# Patient Record
Sex: FEMALE
[De-identification: names, ages, dates, MRNs, and addresses within clinical notes are randomized per-mention and may not be internally consistent; named-entity substitution may affect disease eponyms.]

---

## 2018-03-22 NOTE — PCM.PREANE
Preanesthetic Assessment





- Anesthesia/Transfusion/Family Hx


Anesthesia History: Prior Anesthesia Without Reaction


Transfusion History: No Prior Transfusion(s)





- Review of Systems


General: No Symptoms


Pulmonary: No Symptoms


Cardiovascular: No Symptoms


Gastrointestinal: No Symptoms


Neurological: No Symptoms


Other: Reports: None





- Physical Assessment


Height: 5 ft 3 in


Weight: 63.049 kg


ASA Class: 2


Mental Status: Alert & Oriented x3


Airway Class: Mallampati = 2


Dentition: Reports: Normal Dentition


Thyro-Mental Finger Breadths: 3


Mouth Opening Finger Breadths: 3


ROM/Head Extension: Full


Lungs: Clear to Auscultation, Normal Respiratory Effort


Cardiovascular: Regular Rate, Regular Rhythm





- Lab


Values: 





 Laboratory Last Values











WBC  9.93 K/uL (4.0-11.0)   18  05:30    


 


RBC  3.89 M/uL (4.30-5.90)  L  18  05:30    


 


Hgb  11.4 g/dL (12.0-16.0)  L  18  05:30    


 


Hct  33.5 % (36.0-46.0)  L  18  05:30    


 


MCV  86.1 fL (80.0-98.0)   18  05:30    


 


MCH  29.3 pg (27.0-32.0)   18  05:30    


 


MCHC  34.0 g/dL (31.0-37.0)   18  05:30    


 


RDW Std Deviation  42.9 fl (28.0-62.0)   18  05:30    


 


RDW Coeff of Mayco  14 % (11.0-15.0)   18  05:30    


 


Plt Count  180 K/uL (150-400)   18  05:30    


 


MPV  11.70 fL (7.40-12.00)   18  05:30    


 


Nucleated RBC %  0.0 /100WBC  18  05:30    


 


Nucleated RBCs #  0 K/uL  18  05:30    


 


Blood Type  O POSITIVE   18  05:30    


 


Antibody Screen  NEGATIVE   18  05:30    














- Allergies


Allergies/Adverse Reactions: 


 Allergies











Allergy/AdvReac Type Severity Reaction Status Date / Time


 


No Known Allergies Allergy   Verified 14 10:59














- Acknowledgements


Anesthesia Type Planned: Epidural


Pt an Appropriate Candidate for the Planned Anesthesia: Yes


Alternatives and Risks of Anesthesia Discussed w Pt/Guardian: Yes


Pt/Guardian Understands and Agrees with Anesthesia Plan: Yes





PreAnesthesia Questionnaire





- Past Health History


Medical/Surgical History: Denies Medical/Surgical History


HEENT History: Reports: Other (See Below)


Other HEENT History: Wears eyeglasses


Cardiovascular History: Reports: None


Respiratory History: Reports: None


Gastrointestinal History: Reports: GERD


Genitourinary History: Reports: None


OB/GYN History: Reports: Pregnancy


: 4


Para: 2


LMP (Approximate): Pregnant


Musculoskeletal History: Reports: None


Neurological History: Reports: None


Psychiatric History: Reports: None


Endocrine/Metabolic History: Reports: None


Hematologic History: Reports: None


Immunologic History: Reports: None


Oncologic (Cancer) History: Reports: None


Dermatologic History: Reports: None





- Infectious Disease History


Infectious Disease History: Reports: Chicken Pox





- SUBSTANCE USE


Smoking Status *Q: Never Smoker


Second Hand Smoke Exposure: No


Days Per Week of Alcohol Use: 0


Recreational Drug Use History: No





- HOME MEDS


Home Medications: 


 Home Meds





Cyclobenzaprine [Flexeril] 10 mg PO TID PRN #10 tablet 16 [Rx]


Diclofenac Sodium [Voltaren] 50 mg PO TIDMEALS PRN #10 tab.ec 16 [Rx]


Norethindrone [Venecia] 0.35 mg PO DAILY 16 [History]











- CURRENT (IN HOUSE) MEDS


Current Meds: 





 Current Medications





Butorphanol Tartrate (Stadol)  1 mg IVPUSH Q1H PRN


   PRN Reason: Pain


Carboprost Tromethamine (Hemabate Ds)  250 mcg IM ASDIRECTED PRN


   PRN Reason: Post Partum Hemorrhage


Lactated Ringer's (Ringers, Lactated)  1,000 mls @ 150 mls/hr IV ASDIRECTED DANNI


   Last Admin: 18 06:27 Dose:  150 mls/hr


Tranexamic Acid 1,000 mg/ (Sodium Chloride)  110 mls @ 660 mls/hr IV ONETIME PRN


   PRN Reason: Bleeding


Oxytocin/Sodium Chloride (Oxytocin 30 Unit/500 Ml-Ns)  30 unit in 500 mls @ 2 

mls/hr IV TITRATE DANNI; 2 MUNITS/MIN


   PRN Reason: Protocol


   Last Titration: 18 07:09 Dose:  4 munits/min, 4 mls/hr


Lidocaine HCl (Xylocaine 1%)  50 ml INJECT .ONCE PRN


   PRN Reason: Laceration repair


Methylergonovine Maleate (Methergine)  0.2 mg IM ASDIRECTED PRN


   PRN Reason: Post Partum Hemorrhage


Misoprostol (Cytotec)  200 mcg PO .ONCE PRN


   PRN Reason: Post Partum Hemorrhage


Nalbuphine HCl (Nubain)  10 mg IVPUSH Q1H PRN


   PRN Reason: Pain (severe 7-10)


Sodium Chloride (Saline Flush)  10 ml FLUSH ASDIRECTED PRN


   PRN Reason: Keep Vein Open


Sodium Chloride (Saline Flush)  2.5 ml FLUSH ASDIRECTED PRN


   PRN Reason: Keep Vein Open


Sterile Water (Sterile Water For Irrigation)  1,000 ml IRR ASDIRECTED PRN


   PRN Reason: delivery


Terbutaline Sulfate (Brethine)  0.25 mg SUBCUT ASDIRECTED PRN


   PRN Reason: Tacysystole

## 2018-03-22 NOTE — OR
SURGEON:

Annabel Mayen MD

 

DATE OF PROCEDURE:  2018

 

PREOPERATIVE DIAGNOSES:

1. Intrauterine pregnancy at 40 weeks and 3 days.

2. Postdates induction of labor.

 

POSTOPERATIVE DIAGNOSES:

1. Intrauterine pregnancy at 40 weeks and 3 days.

2. Postdates induction of labor.

3. Delivered.

 

PROCEDURE:

Spontaneous vaginal delivery.

 

ANESTHESIA:

Epidural.

 

ESTIMATED BLOOD LOSS:

150 mL.

 

COMPLICATIONS:

None.

 

DISPOSITION:

Mother and baby stable in Labor and Delivery room, [facility].

 

FINDINGS:

Male infant, weight 3700 g, Apgar score 8 and 9 at 1 and 5 minutes respectively.

Grossly normal placenta with three-vessel cord.  Intact perineum.

 

BRIEF HISTORY:

Abeba is a 27-year-old G4, P2-0-1-2, who was admitted today at 40 weeks and 3

days gestation for postdates induction of labor.  Her prenatal care was

uncomplicated.  GBS negative.  On admission, she was 2 to 3 cm dilated, 80%

effaced, station -2.  Oxytocin infusion titration for induction of labor was

commenced as per protocol and an artificial rupture of membrane was performed 3

hours later with clear fluid.She subsequently received epidural for pain 
management.  

She progressed quickly on only a maximum dose of 6 milliunits per minute and 
with

increasing rectal pressure, she commenced active pushing.  Fetal heart tracing

was category 1 throughout.  She pushed quite well bringing the baby's head down

to a +4 station and was set up for delivery in modified dorsal lithotomy

position.

 

PROCEDURE IN DETAIL:

She had a spontaneous vaginal delivery of a live male infant in right

occipitoanterior position, no nuchal cord, clear amniotic fluid at delivery.

Anterior and posterior shoulders and the rest of the baby were delivered without

difficulty.  Baby was vigorous and cried spontaneously at birth.  The baby was

delivered onto the maternal abdomen.  Delayed cord clamping was observed and the

cord was subsequently cut by the father of the baby. With delivery of the

infant, oxytocin infusion was converted to postpartum titration for active

management of 3rd stage of labor. Cord blood and gas samples were obtained.

Placenta was delivered by controlled cord traction, appeared to be complete and

intact.  Examination of the perineum revealed no lacerations.  Uterine massage

was performed.  Uterus was found to be well contracted below the umbilicus.  The

patient tolerated the procedure well.  Sponge, instrument, and needle counts

were correct at the end of the delivery.

 

 

ADUMVIV / MODL

DD:  2018 12:04:22

DT:  2018 13:41:20

Job #:  [id number]

VIVI

## 2018-03-23 NOTE — [id number]
Post Anesthesia Note





- EVALUATION WITHIN 48HRS OF ANESTHETIC


Vital Signs in Normal Range: Yes


Patient Participated in Evaluation: Yes


Respiratory Function Stable: Yes


Airway Patent: Yes


Cardiovascular Function Stable: Yes


Hydration Status Stable: Yes


Pain Control Satisfactory: Yes


Nausea and Vomiting Control Satisfactory: Yes


Mental Status Recovered: Yes


Resp Rate: 18

## 2018-03-23 NOTE — PCM.PNPP
- General Info


Date of Service: 18


Functional Status: Reports: Pain Controlled, Tolerating Diet, Ambulating, 

Urinating





- Review of Systems


General: Denies: Fever, Fatigue, Malaise


HEENT: Denies: Headaches


Pulmonary: Denies: Shortness of Breath, Pleuritic Chest Pain


Cardiovascular: Denies: Chest Pain, Palpitations, Dyspnea on Exertion


Gastrointestinal: Denies: Abdominal Pain


Genitourinary: Reports: Flank Pain.  Denies: Dysuria, Incontinence


Psychiatric: Denies: Depression, Mood Lability, Anxiety





- General Info


Date of Service: 18





- Patient Data


Vital Signs - Most Recent: 


 Last Vital Signs











Temp  36.4 C   18 05:38


 


Pulse  59 L  18 05:38


 


Resp  16   18 05:38


 


BP  99/58 L  18 05:38


 


Pulse Ox  96   18 05:38











Weight - Most Recent: 139 lb


Lab Results - Last 24 Hours: 


 Laboratory Results - last 24 hr











  18 Range/Units





  11:00 04:47 


 


Hgb   10.0 L  (12.0-16.0)  g/dL


 


Hct   29.6 L  (36.0-46.0)  %


 


Cord ABG pH  7.282   (7.18-7.38)  


 


Cord ABG Base Excess  -6   (-10--2)  


 


Cord VBG pH  7.384   (7.25-7.45)  


 


Cord VBG Base Excess  -4   (-10--2)  











Med Orders - Current: 


 Current Medications





Acetaminophen (Tylenol Extra Strength)  500 mg PO Q4H PRN


   PRN Reason: Pain


Acetaminophen (Tylenol Extra Strength)  1,000 mg PO Q4H PRN


   PRN Reason: Pain


Benzocaine/Menthol (Dermoplast Pain Relief 20%-0.5% Spray)  78 gm TOP 

ASDIRECTED PRN


   PRN Reason: Perineal Comfort Measure


Bisacodyl (Dulcolax)  10 mg RECTAL .ONCE PRN


   PRN Reason: Constipation


Docusate Sodium (Colace)  100 mg PO BID PRN


   PRN Reason: Constipation


Emollient Ointment (Lansinoh Hpa)  0 gm TOP ASDIRECTED PRN


   PRN Reason: Sore Nipples


Ibuprofen (Motrin)  400 mg PO Q4H PRN


   PRN Reason: Pain


Ibuprofen (Motrin)  800 mg PO Q6H PRN


   PRN Reason: Pain


   Last Admin: 18 05:26 Dose:  800 mg


Oxycodone HCl (Oxycodone)  5 mg PO Q2H PRN


   PRN Reason: Pain


   Last Admin: 18 17:49 Dose:  5 mg


Witch Hazel (Tucks)  1 pad TOP ASDIRECTED PRN


   PRN Reason: comfort care





Discontinued Medications





Butorphanol Tartrate (Stadol)  1 mg IVPUSH Q1H PRN


   PRN Reason: Pain


Carboprost Tromethamine (Hemabate Ds)  250 mcg IM ASDIRECTED PRN


   PRN Reason: Post Partum Hemorrhage


Lactated Ringer's (Ringers, Lactated)  1,000 mls @ 150 mls/hr IV ASDIRECTED DANNI


   Last Admin: 18 09:30 Dose:  500 mls/hr


Tranexamic Acid 1,000 mg/ (Sodium Chloride)  110 mls @ 660 mls/hr IV ONETIME PRN


   PRN Reason: Bleeding


Oxytocin/Sodium Chloride (Oxytocin 30 Unit/500 Ml-Ns)  30 unit in 500 mls @ 2 

mls/hr IV TITRATE DANNI; 2 MUNITS/MIN


   PRN Reason: Protocol


   Last Titration: 18 11:11 Dose:  999 mls/hr


Fentanyl/Bupivacaine HCl (Fentanyl-Bupiv-Ns 2 Mcg/Ml-0.125%) Confirm 

Administered Dose 100 mls @ as directed EP .ST-MED ONE


   Stop: 18 09:03


Lidocaine HCl (Xylocaine 1%)  50 ml INJECT .ONCE PRN


   PRN Reason: Laceration repair


Methylergonovine Maleate (Methergine)  0.2 mg IM ASDIRECTED PRN


   PRN Reason: Post Partum Hemorrhage


Misoprostol (Cytotec)  200 mcg PO .ONCE PRN


   PRN Reason: Post Partum Hemorrhage


Nalbuphine HCl (Nubain)  10 mg IVPUSH Q1H PRN


   PRN Reason: Pain (severe 7-10)


Sodium Chloride (Saline Flush)  10 ml FLUSH ASDIRECTED PRN


   PRN Reason: Keep Vein Open


Sodium Chloride (Saline Flush)  2.5 ml FLUSH ASDIRECTED PRN


   PRN Reason: Keep Vein Open


Sterile Water (Sterile Water For Irrigation)  1,000 ml IRR ASDIRECTED PRN


   PRN Reason: delivery


Terbutaline Sulfate (Brethine)  0.25 mg SUBCUT ASDIRECTED PRN


   PRN Reason: Tacysystole











- Infant Interaction


Infant Disposition, Postpartum:  in Room with Family


Infant Feeding: Continues to Breastfeed


Support Person: 





- Postpartum Recovery Exam


Fundal Level: 2 Fingerbreadths Above Umbilicus


Fundal Placement: Midline


Lochia Amount: Scant


Episiotomy/Laceration: None


Urinary Elimination: Voided





- Exam


General: Alert, Oriented


HEENT: Pupils Equal


Neck: Supple


Lungs: Clear to Auscultation, Normal Respiratory Effort


Cardiovascular: Regular Rate, Regular Rhythm


GI/Abdominal Exam: Non-Tender


Extremities: Non-Tender, Pedal Edema


Skin: Warm


Psy/Mental Status: Alert, Normal Affect, Normal Mood





- Problem List & Annotations


(1) Vaginal delivery


SNOMED Code(s): 129407669


   Code(s): O80 - ENCOUNTER FOR FULL-TERM UNCOMPLICATED DELIVERY   Status: 

Acute   Current Visit: Yes   





- Problem List Review


Problem List Initiated/Reviewed/Updated: Yes





- My Orders


Last 24 Hours: 


My Active Orders





18 11:27


Patient Status [ADT] Routine 


May Shower [RC] ASDIRECTED 


Up ad Winsome [RC] ASDIRECTED 


Vital Signs [RC] PER UNIT ROUTINE 


Acetaminophen [Tylenol Extra Strength]   1,000 mg PO Q4H PRN 


Acetaminophen [Tylenol Extra Strength]   500 mg PO Q4H PRN 


Benzocaine/Menthol [Dermoplast Pain Relief 20%-0.5% Spray]   78 gm TOP 

ASDIRECTED PRN 


Bisacodyl [Dulcolax]   10 mg RECTAL .ONCE PRN 


Docusate Sodium [Colace]   100 mg PO BID PRN 


Ibuprofen [Motrin]   400 mg PO Q4H PRN 


Ibuprofen [Motrin]   800 mg PO Q6H PRN 


Lanolin [Lansinoh HPA]   See Dose Instructions  TOP ASDIRECTED PRN 


Witch Hazel [Tucks]   1 pad TOP ASDIRECTED PRN 


oxyCODONE   5 mg PO Q2H PRN 


Assess Lochia [WOMSER] Per Unit Routine 


Assess Uterine Involution [WOMSER] Per Unit Routine 


Peripheral IV Discontinue [OM.PC] Routine 


Resuscitation Status Routine 





18 11:28


Perineal Care [OM.PC] Per Unit Routine 





18 Breakfast


Regular Diet [DIET] 














- Assessment


Assessment:: 


PPD#1 s/P , stable or afebrile


Clinically stable for discharge today








- Plan


Plan:: 


Discharge instructions reviewed with patient


Nothing in the vagina for 6 weeks


Bleeding and infection precautions reviewed


Continue PNV. 


Postpartum blues/ depression S/S reviewed


Follow up in 6 weeks

## 2021-07-03 ENCOUNTER — HOSPITAL ENCOUNTER (INPATIENT)
Dept: HOSPITAL 56 - MW.OBCHECK | Age: 31
LOS: 1 days | Discharge: HOME | DRG: 560 | End: 2021-07-04
Attending: OBSTETRICS & GYNECOLOGY | Admitting: OBSTETRICS & GYNECOLOGY
Payer: COMMERCIAL

## 2021-07-03 DIAGNOSIS — Z3A.39: ICD-10-CM

## 2021-07-03 DIAGNOSIS — Z20.822: ICD-10-CM

## 2021-07-03 PROCEDURE — U0002 COVID-19 LAB TEST NON-CDC: HCPCS

## 2021-07-03 PROCEDURE — 3E0R3BZ INTRODUCTION OF ANESTHETIC AGENT INTO SPINAL CANAL, PERCUTANEOUS APPROACH: ICD-10-PCS | Performed by: OBSTETRICS & GYNECOLOGY

## 2021-07-03 PROCEDURE — 10907ZC DRAINAGE OF AMNIOTIC FLUID, THERAPEUTIC FROM PRODUCTS OF CONCEPTION, VIA NATURAL OR ARTIFICIAL OPENING: ICD-10-PCS | Performed by: OBSTETRICS & GYNECOLOGY

## 2021-07-03 NOTE — PCM.LDHP
L&D History of Present Illness





- General


Date of Service: 07/03/21


Admit Problem/Dx: 


                   Patient Status Order with Admit Dx/Problem





07/03/21 08:00


Patient Status [ADT] Routine 





07/03/21 10:00


Patient Status [ADT] Routine 








                           Admission Diagnosis/Problem











Admission Diagnosis/Problem    Pregnancy














Source of Information: Patient


History Limitations: Reports: No Limitations





- History of Present Illness


Improves with: Reports: None


Worsens with: Reports: None


Associated Symptoms: Reports: N





- Related Data


Allergies/Adverse Reactions: 


                                    Allergies











Allergy/AdvReac Type Severity Reaction Status Date / Time


 


No Known Allergies Allergy   Verified 07/03/21 09:27











Home Medications: 


                                    Home Meds





Calcium Carbonate [Calcium] 500 mg PO DAILY 07/03/21 [History]


Omega-3 Fatty Acids/Fish Oil [Fish Oil 1,000 mg Capsule] 1 each PO DAILY 

07/03/21 [History]


Prenatal Vits #93/Iron Fum/FA [Prenatal Formula Tablet] 1 each PO DAILY 07/03/21

[History]











Past Medical History





- Past Health History


Medical/Surgical History: Denies Medical/Surgical History


HEENT History: Reports: Other (See Below)


Other HEENT History: Wears eyeglasses


Cardiovascular History: Reports: None


Respiratory History: Reports: None


Gastrointestinal History: Reports: GERD


Genitourinary History: Reports: None


OB/GYN History: Reports: Pregnancy


Musculoskeletal History: Reports: None


Neurological History: Reports: None


Psychiatric History: Reports: None


Endocrine/Metabolic History: Reports: None


Hematologic History: Reports: None


Immunologic History: Reports: None


Oncologic (Cancer) History: Reports: None


Dermatologic History: Reports: None





- Infectious Disease History


Infectious Disease History: Reports: Chicken Pox





Social & Family History





- Family History


Family Medical History: No Pertinent Family History


Cardiac: Reports: Heart Valve Replacement, Hypertension, MI


Respiratory: Reports: COPD


GI: Reports: None


: Reports: None


OBGYN: Reports: Pregnancy


Musculoskeletal: Reports: None


Neurological: Reports: CVA, Seizure


Psychiatric: Reports: None


Endocrine/Metabolic: Reports: Diabetes, Type I


Hematologic: Reports: Anemia


Dermatologic: Reports: None


Oncologic: Reports: Colon





- Caffeine Use


Caffeine Use: Reports: Coffee, Soda





H&P Review of Systems





- Review of Systems:


Review Of Systems: See Below


General: Reports: No Symptoms


HEENT: Reports: No Symptoms


Pulmonary: Reports: No Symptoms


Cardiovascular: Reports: No Symptoms


Gastrointestinal: Reports: No Symptoms


Genitourinary: Reports: No Symptoms


Musculoskeletal: Reports: No Symptoms


Skin: Reports: No Symptoms


Psychiatric: Reports: No Symptoms


Neurological: Reports: No Symptoms


Hematologic/Lymphatic: Reports: No Symptoms


Immunologic: Reports: No Symptoms





L&D Exam





- Exam


Exam: See Below





- Vital Signs


Weight: 63.957 kg





- OB Specific


Contraction Intensity: Moderate


Fetal Movement: Active


Fetal Heart Tones: Present


Birth Presentation: Vertex





- Lundy Score


Lundy Score Cervix Position: Anterior


Lundy Score Consistency: Soft


Lundy Score Effacement: >80%


Lundy Score Dilation: > 5 cm


Lundy Score Infant's Station: -3


Lundy Score Total: 10





- Exam


General: Alert, Oriented


HEENT: PERRLA, Conjunctiva Clear, EACs Clear, EOMI, Hearing Intact, Mucosa Moist

 & Pink, Nares Patent, Normal Nasal Septum, Posterior Pharynx Clear, TMs Clear


Neck: Supple, Trachea Midline


Lungs: Clear to Auscultation, Normal Respiratory Effort


Cardiovascular: Regular Rate, Regular Rhythm


GI/Abdominal Exam: Normal Bowel Sounds, Soft, Non-Tender, No Organomegaly, No 

Distention, No Abnormal Bruit, No Mass, Pelvis Stable


Rectal Exam: Normal Exam, Normal Rectal Tone


Genitourinary: Normal external exam, Normal bimanual exam, Normal speculum exam


Back Exam: Normal Inspection, Full Range of Motion


Extremities: Normal Inspection, Normal Range of Motion, Non-Tender, No Pedal 

Edema, Normal Capillary Refill


Skin: Warm, Dry, Intact


Neurological: Cranial Nerves Intact, Reflexes Equal Bilateral


Psychiatric: Alert, Normal Affect, Normal Mood





- Patient Data


Lab Results Last 24 hrs: 


                         Laboratory Results - last 24 hr











  07/03/21 Range/Units





  10:23 


 


WBC  10.20  (4.0-11.0)  K/uL


 


RBC  3.79 L  (4.30-5.90)  M/uL


 


Hgb  11.2 L  (12.0-16.0)  g/dL


 


Hct  33.0 L  (36.0-46.0)  %


 


MCV  87.1  (80.0-98.0)  fL


 


MCH  29.6  (27.0-32.0)  pg


 


MCHC  33.9  (31.0-37.0)  g/dL


 


RDW Std Deviation  43.6  (28.0-62.0)  fl


 


RDW Coeff of Mayco  14  (11.0-15.0)  %


 


Plt Count  179  (150-400)  K/uL


 


MPV  12.20 H  (7.40-12.00)  fL


 


Nucleated RBC %  0.0  /100WBC


 


Nucleated RBCs #  0  K/uL











Result Diagrams: 


                                 07/03/21 10:23





Problem List Initiated/Reviewed/Updated: Yes


Orders Last 24hrs: 


                               Active Orders 24 hr











 Category Date Time Status


 


 Patient Status [ADT] Routine ADT  07/03/21 10:00 Active


 


 Fetal Heart Tones [RC] CONTINUOUS Care  07/03/21 10:00 Active


 


 Fetal Non Stress Test [RC] PER UNIT ROUTINE Care  07/03/21 09:30 Active


 


 May Shower [RC] ASDIRECTED Care  07/03/21 10:00 Active


 


 Notify Provider [RC] PRN Care  07/03/21 10:00 Active


 


 Peripheral IV Care [RC] PRN Care  07/03/21 10:00 Active


 


 Up ad Winsome [RC] ASDIRECTED Care  07/03/21 09:30 Active


 


 Vaginal Exam [RC] Click to Edit Care  07/03/21 09:30 Active


 


 Vital Signs [RC] PER UNIT ROUTINE Care  07/03/21 09:30 Active


 


 Regular Diet [DIET] Diet  07/03/21 Lunch Active


 


 CORONAVIRUS COVID-19 NEYMAR [MOLEC] Urgent Lab  07/03/21 10:05 Received


 


 RPR (SYPHILIS SERO) W/ RFLX [REF] Routine Lab  07/03/21 10:23 Received


 


 TYPE AND SCREEN [BBK] Routine Lab  07/03/21 10:23 Received


 


 Ampicillin 1 gm Med  07/03/21 14:30 Active





 Sodium Chloride 0.9% [Normal Saline] 50 ml   





 IV Q4H   


 


 Ampicillin 2 gm Med  07/03/21 10:30 Active





 Sodium Chloride 0.9% [Normal Saline] 100 ml   





 IV ONETIME   


 


 Butorphanol [Stadol] Med  07/03/21 10:00 Active





 1 mg IVPUSH Q1H PRN   


 


 Carboprost Tromethamine [Hemabate DS] Med  07/03/21 10:00 Active





 250 mcg IM ASDIRECTED PRN   


 


 Lactated Ringers [Ringers, Lactated] 1,000 ml Med  07/03/21 10:00 Active





 IV ASDIRECTED   


 


 Lidocaine 1% [Xylocaine 1%] Med  07/03/21 10:00 Active





 50 ml INJECT ONETIME PRN   


 


 Methylergonovine [Methergine] Med  07/03/21 10:00 Active





 0.2 mg IM ASDIRECTED PRN   


 


 Nalbuphine [Nubain] Med  07/03/21 10:00 Active





 10 mg IVPUSH Q1H PRN   


 


 Ondansetron [Zofran] Med  07/03/21 10:00 Active





 4 mg IVPUSH Q6H PRN   


 


 Oxytocin/0.9 % Sodium Chloride [Oxytocin 30 Unit/500 ML Med  07/03/21 10:00 

Active





 -NS]   





 30 unit in 500 ml IV TITRATE   


 


 Sodium Chloride 0.9% [Normal Saline] Med  07/03/21 10:00 Active





 10 ml IV ASDIRECTED PRN   


 


 Sodium Chloride 0.9% [Saline Flush] Med  07/03/21 10:00 Active





 10 ml FLUSH ASDIRECTED PRN   


 


 Sodium Chloride 0.9% [Saline Flush] Med  07/03/21 10:00 Active





 2.5 ml FLUSH ASDIRECTED PRN   


 


 Tranexamic Acid [Cyklokapron] 1,000 mg Med  07/03/21 10:00 Active





 Sodium Chloride 0.9% [Normal Saline] 100 ml   





 IV ONETIME   


 


 Water For Irrigation,Sterile [Sterile Water for Med  07/03/21 10:00 Active





 Irrigation]   





 1,000 ml IRR ASDIRECTED PRN   


 


 miSOPROStoL [Cytotec] Med  07/03/21 10:00 Active





 200 mcg PO ONETIME PRN   


 


 Fetal Scalp Electrode [WOMSER] Per Unit Routine Oth  07/03/21 10:00 Ordered


 


 Peripheral IV Insertion Adult [OM.PC] Routine Oth  07/03/21 10:00 Ordered


 


 Resuscitation Status Routine Resus Stat  07/03/21 09:30 Ordered








                                Medication Orders





Butorphanol Tartrate (Butorphanol 1 Mg/Ml Sdv)  1 mg IVPUSH Q1H PRN


   PRN Reason: Pain (severe 7-10)


Carboprost Tromethamine (Carboprost Tromethamine 250 Mcg/1 Ml Amp)  250 mcg IM 

ASDIRECTED PRN


   PRN Reason: Post Partum Hemorrhage


Lactated Ringer's (Ringers, Lactated)  1,000 mls @ 150 mls/hr IV ASDIRECTED DANNI


   Last Admin: 07/03/21 10:30  Dose: 150 mls/hr


   Documented by: [email or web address]


Oxytocin/Sodium Chloride (Oxytocin 30 Unit/500 Ml-Ns)  30 unit in 500 mls @ 999 

mls/hr IV TITRATE [facility]


Tranexamic Acid 1,000 mg/ (Sodium Chloride)  110 mls @ 660 mls/hr IV ONETIME PRN


   PRN Reason: Bleeding


Ampicillin Sodium 2 gm/ Sodium (Chloride)  100 mls @ 200 mls/hr IV ONETIME ONE


   Stop: 07/03/21 10:59


   Last Admin: 07/03/21 10:35  Dose: 200 mls/hr


   Documented by: [email or web address]


Ampicillin Sodium 1 gm/ Sodium (Chloride)  50 mls @ 100 mls/hr IV Q4H [facility]


Lidocaine HCl (Lidocaine 1% 50 Ml Mdv)  50 ml INJECT ONETIME PRN


   PRN Reason: Laceration repair


Methylergonovine Maleate (Methylergonovine 0.2 Mg/1 Ml Amp)  0.2 mg IM 

ASDIRECTED PRN


   PRN Reason: Post Partum Hemorrhage


Misoprostol (Misoprostol 200 Mcg Tab)  200 mcg PO ONETIME PRN


   PRN Reason: Post Partum Hemorrhage


Nalbuphine HCl (Nalbuphine 10 Mg/1 Ml Vial)  10 mg IVPUSH Q1H PRN


   PRN Reason: Pain (severe 7-10)


Ondansetron HCl (Ondansetron 4 Mg/2 Ml Sdv)  4 mg IVPUSH Q6H PRN


   PRN Reason: Nausea/Vomiting


Sodium Chloride (Sodium Chloride 0.9% 10 Ml Syringe)  10 ml FLUSH ASDIRECTED PRN


   PRN Reason: Keep Vein Open


Sodium Chloride (Sodium Chloride 0.9% 2.5 Ml Syringe)  2.5 ml FLUSH ASDIRECTED 

PRN


   PRN Reason: Keep Vein Open


Sodium Chloride (Sodium Chloride 0.9% 10 Ml Sdv)  10 ml IV ASDIRECTED PRN


   PRN Reason: IV Use


Sterile Water (Water For Irrigation,Sterile 1,000 Ml Container)  1,000 ml IRR 

ASDIRECTED PRN


   PRN Reason: delivery








Assessment/Plan Comment:: 





IUP 39+6 . multipara in active labor. GBS + on antibiotic as per protocol

## 2021-07-03 NOTE — PCM.PREANE
Preanesthetic Assessment





- Anesthesia/Transfusion/Family Hx


Anesthesia History: Prior Anesthesia Without Reaction


Family History of Anesthesia Reaction: No


Transfusion History: No Prior Transfusion(s)





- Review of Systems


General: No Symptoms


Pulmonary: No Symptoms


Cardiovascular: No Symptoms


Gastrointestinal: No Symptoms


Neurological: No Symptoms


Other: Reports: None





- Physical Assessment


NPO Status Date: 21


NPO Status Time: 10:00


Height: 5 ft 2 in


Weight: 141 lb


ASA Class: 2


Mental Status: Alert & Oriented x3


Airway Class: Mallampati = 2


Dentition: Reports: Normal Dentition


ROM/Head Extension: Full


Lungs: Clear to Auscultation, Normal Respiratory Effort


Cardiovascular: Regular Rate, Regular Rhythm





- Lab


Values: 





                             Laboratory Last Values











WBC  10.20 K/uL (4.0-11.0)   21  10:23    


 


RBC  3.79 M/uL (4.30-5.90)  L  21  10:23    


 


Hgb  11.2 g/dL (12.0-16.0)  L  21  10:23    


 


Hct  33.0 % (36.0-46.0)  L  21  10:23    


 


MCV  87.1 fL (80.0-98.0)   21  10:23    


 


MCH  29.6 pg (27.0-32.0)   21  10:23    


 


MCHC  33.9 g/dL (31.0-37.0)   21  10:23    


 


RDW Std Deviation  43.6 fl (28.0-62.0)   21  10:23    


 


RDW Coeff of Mayco  14 % (11.0-15.0)   21  10:23    


 


Plt Count  179 K/uL (150-400)   21  10:23    


 


MPV  12.20 fL (7.40-12.00)  H  21  10:23    


 


Nucleated RBC %  0.0 /100WBC  21  10:23    


 


Nucleated RBCs #  0 K/uL  21  10:23    


 


SARS-CoV-2 RNA (NEYMAR)  NEGATIVE  (NEGATIVE)   21  10:05    


 


Blood Type  O POSITIVE   21  10:23    


 


Antibody Screen  NEGATIVE   21  10:23    














- Allergies


Allergies/Adverse Reactions: 


                                    Allergies











Allergy/AdvReac Type Severity Reaction Status Date / Time


 


No Known Allergies Allergy   Verified 21 09:27














- Blood


Blood Available: Yes


Product(s) Available: PRBC





- Anesthesia Plan


Pre-Op Medication Ordered: None





- Acknowledgements


Anesthesia Type Planned: Epidural


Pt an Appropriate Candidate for the Planned Anesthesia: Yes


Alternatives and Risks of Anesthesia Discussed w Pt/Guardian: Yes


Pt/Guardian Understands and Agrees with Anesthesia Plan: Yes





PreAnesthesia Questionnaire





- Past Health History


Medical/Surgical History: Denies Medical/Surgical History


HEENT History: Reports: Impaired Vision, Other (See Below)


Other HEENT History: Wears contacts


Cardiovascular History: Reports: None


Respiratory History: Reports: None


Gastrointestinal History: Reports: GERD, Hemorrhoids, Other (See Below)


Other Gastrointestinal History: GERD during pregnancies


Genitourinary History: Reports: Renal Calculus, Other (See Below)


Other Genitourinary History: kidney stones in past, approx 


OB/GYN History: Reports: Pregnancy, Spontaneous 


Musculoskeletal History: Reports: None


Neurological History: Reports: Migraines, Other (See Below)


Other Neuro History: treated with OTC medications or home remedies only


Psychiatric History: Reports: Other (See Below)


Other Psychiatric History: dyslexic


Endocrine/Metabolic History: Reports: None


Hematologic History: Reports: Anemia, Other (See Below)


Other Hematologic History: anemia during first pregnancy only


Immunologic History: Reports: None


Oncologic (Cancer) History: Reports: None


Dermatologic History: Reports: None





- Infectious Disease History


Infectious Disease History: Reports: Chicken Pox, Influenza, Mononucleosis





- Past Surgical History


HEENT Surgical History: Reports: None


GI Surgical History: Reports: None


Female  Surgical History: Reports: None


Neurological Surgical History: Reports: None


Dermatological Surgical History: Reports: None





- SUBSTANCE USE


Tobacco Use Status *Q: Never Tobacco User


Second Hand Smoke Exposure: No


Recreational Drug Use History: No





- HOME MEDS


Home Medications: 


                                    Home Meds





Calcium Carbonate [Calcium] 500 mg PO DAILY 21 [History]


Omega-3 Fatty Acids/Fish Oil [Fish Oil 1,000 mg Capsule] 1 each PO DAILY 

21 [History]


Prenatal Vits #93/Iron Fum/FA [Prenatal Formula Tablet] 1 each PO DAILY 21

[History]











- CURRENT (IN HOUSE) MEDS


Current Meds: 





                               Current Medications





Butorphanol Tartrate (Butorphanol 1 Mg/Ml Sdv)  1 mg IVPUSH Q1H PRN


   PRN Reason: Pain (severe 7-10)


Carboprost Tromethamine (Carboprost Tromethamine 250 Mcg/1 Ml Amp)  250 mcg IM 

ASDIRECTED PRN


   PRN Reason: Post Partum Hemorrhage


Lactated Ringer's (Ringers, Lactated)  1,000 mls @ 150 mls/hr IV ASDIRECTED [facility]


   Last Admin: 21 10:30 Dose:  150 mls/hr


   Documented by: 


Oxytocin/Sodium Chloride (Oxytocin 30 Unit/500 Ml-Ns)  30 unit in 500 mls @ 999 

mls/hr IV TITRATE [facility]


Tranexamic Acid 1,000 mg/ (Sodium Chloride)  110 mls @ 660 mls/hr IV ONETIME PRN


   PRN Reason: Bleeding


Ampicillin Sodium 1 gm/ Sodium (Chloride)  50 mls @ 100 mls/hr IV Q4H [facility]


   Last Admin: 21 14:56 Dose:  100 mls/hr


   Documented by: 


Lidocaine HCl (Lidocaine 1% 50 Ml Mdv)  50 ml INJECT ONETIME PRN


   PRN Reason: Laceration repair


Methylergonovine Maleate (Methylergonovine 0.2 Mg/1 Ml Amp)  0.2 mg IM 

ASDIRECTED PRN


   PRN Reason: Post Partum Hemorrhage


Misoprostol (Misoprostol 200 Mcg Tab)  200 mcg PO ONETIME PRN


   PRN Reason: Post Partum Hemorrhage


Nalbuphine HCl (Nalbuphine 10 Mg/1 Ml Vial)  10 mg IVPUSH Q1H PRN


   PRN Reason: Pain (severe 7-10)


Ondansetron HCl (Ondansetron 4 Mg/2 Ml Sdv)  4 mg IVPUSH Q6H PRN


   PRN Reason: Nausea/Vomiting


Sodium Chloride (Sodium Chloride 0.9% 10 Ml Syringe)  10 ml FLUSH ASDIRECTED PRN


   PRN Reason: Keep Vein Open


Sodium Chloride (Sodium Chloride 0.9% 2.5 Ml Syringe)  2.5 ml FLUSH ASDIRECTED 

PRN


   PRN Reason: Keep Vein Open


Sodium Chloride (Sodium Chloride 0.9% 10 Ml Sdv)  10 ml IV ASDIRECTED PRN


   PRN Reason: IV Use


Sterile Water (Water For Irrigation,Sterile 1,000 Ml Container)  1,000 ml IRR 

ASDIRECTED PRN


   PRN Reason: delivery





Discontinued Medications





Bupivacaine HCl (Bupivacaine 0.25% 10 Ml Sdv) Confirm Administered Dose 10 ml 

.ROUTE .STK-MED ONE


   Stop: 21 16:00


Ampicillin Sodium 2 gm/ Sodium (Chloride)  100 mls @ 200 mls/hr IV ONETIME ONE


   Stop: 21 10:59


   Last Admin: 21 10:35 Dose:  200 mls/hr


   Documented by: 


Ropivacaine (Naropin 0.2%) Confirm Administered Dose 200 mls @ as directed 

.ROUTE .STK-MED ONE


   Stop: 21 16:00











- Pre-Procedure Checklist


Attending Provider Aware: Yes


Chart Reviewed: Yes


Consent Signed: Yes


Labs Reviewed: Yes


VS/FHR Reviewed: Yes


Patient Identification Confirmation Method: Reports: Verbal Patient


Pt an Appropriate Candidate for the Planned Anesthesia: Yes


Alternatives and Risks of Anesthesia Discussed w Pt/Guardian: Yes


Pre-Procedure Checklist Comment: complete





- Procedure


Procedure Start Date: 21


Procedure Start Time: 16:03


Monitors in Place: Reports: Blood Pressure, Heart Rate, SPO2


Functional IV: Yes


Safety Measures: Reports: Patient Identified, Procedure Verified, Site Verified,

 Procedure Time Out


Patient Position: Reports: Sitting


Prep: Reports: Betadine x3


Local Anesthetic: Reports: Intradermal Wheal w Lidocaine 1%


Regional Placement Level: Reports: L3-4


Needle: Reports: 17 g Touhy


Approach: Reports: Midline


Technique: Reports: CORNELIO Plastic Syringe


Parasthesia: Reports: None


Fluid Obtained: Reports: None


Test Dose Time: 16:08


Test Dose Medication: Reports: Lidocaine 1.5% w Epinephrine 1:200,000


Test Dose Response: Reports: Negative


Loading Dose Time: 16:07


Loading Dose Medication: bupivicaine 0.25% 10cc


Loading Dose Patient Position: sitting


Continuous Infusion Start Time: 16:10


Continuous Infusion Medication: ropivicaine 0.2%


Continuous Infusion Rate: 15


Continuous Infusion Lockout Dose (cc/hr): 4


Patient Position Post Placement: Reports: Supline/NENA


Level Achieved: adequate


VS and FHR Monitored in Unit Post Placement: Yes


Procedure End Date: 21


Procedure End Time: 17:03

## 2021-07-04 VITALS — HEART RATE: 77 BPM | DIASTOLIC BLOOD PRESSURE: 67 MMHG | SYSTOLIC BLOOD PRESSURE: 115 MMHG

## 2021-07-04 NOTE — PCM.PNPP
- General Info


Date of Service: 21


Functional Status: Reports: Pain Controlled





- Review of Systems


General: Reports: No Symptoms


HEENT: Reports: No Symptoms


Pulmonary: Reports: No Symptoms


Cardiovascular: Reports: No Symptoms


Gastrointestinal: Reports: No Symptoms


Genitourinary: Reports: No Symptoms


Musculoskeletal: Reports: No Symptoms


Skin: Reports: No Symptoms


Neurological: Reports: No Symptoms


Psychiatric: Reports: No Symptoms





- General Info


Date of Service: 21





- Patient Data


Vital Signs - Most Recent: 


                                Last Vital Signs











Temp  35.9 C L  21 08:15


 


Pulse  63   21 08:15


 


Resp  16   21 08:15


 


BP  128/80   21 08:15


 


Pulse Ox  98   21 08:15











Weight - Most Recent: 63.957 kg


I&O - Last 24 Hours: 


                                 Intake & Output











 21





 22:59 06:59 14:59


 


Output Total 350  


 


Balance -350  











Lab Results - Last 24 Hours: 


                         Laboratory Results - last 24 hr











  21 Range/Units





  10:05 10:23 05:28 


 


Hgb    10.6 L  (12.0-16.0)  g/dL


 


Hct    31.0 L  (36.0-46.0)  %


 


SARS-CoV-2 RNA (NEYMAR)  NEGATIVE    (NEGATIVE)  


 


Blood Type   O POSITIVE   


 


Antibody Screen   NEGATIVE   











Med Orders - Current: 


                               Current Medications





Acetaminophen (Acetaminophen 500 Mg Tab)  500 mg PO Q4H PRN


   PRN Reason: Pain (mild 1-3)


Acetaminophen (Acetaminophen 500 Mg Tab)  1,000 mg PO Q4H PRN


   PRN Reason: Pain (mild 1-3)


   Last Admin: 21 02:28 Dose:  1,000 mg


   Documented by: 


Benzocaine/Menthol (Benzocaine/Menthol 20%-0.5% Spray 78 Gm Cannister)  78 gm 

TOP ASDIRECTED PRN


   PRN Reason: Perineal Comfort Measure


   Last Admin: 21 21:20 Dose:  1 canister


   Documented by: 


Bisacodyl (Bisacodyl 10 Mg Supp)  10 mg RECTAL ONETIME PRN


   PRN Reason: Constipation


Butorphanol Tartrate (Butorphanol 1 Mg/Ml Sdv)  1 mg IVPUSH Q1H PRN


   PRN Reason: Pain (severe 7-10)


Carboprost Tromethamine (Carboprost Tromethamine 250 Mcg/1 Ml Amp)  250 mcg IM 

ASDIRECTED PRN


   PRN Reason: Post Partum Hemorrhage


Docusate Sodium (Docusate Sodium 100 Mg Cap)  100 mg PO Q12H PRN


   PRN Reason: Constipation


   Last Admin: 21 08:38 Dose:  100 mg


   Documented by: 


Emollient Ointment (Lanolin 100% Cream 7 Gm Tube)  0 gm TOP ASDIRECTED PRN


   PRN Reason: Sore Nipples


   Last Admin: 21 21:20 Dose:  7 gm


   Documented by: 


Lactated Ringer's (Ringers, Lactated)  1,000 mls @ 150 mls/hr IV ASDIRECTED [facility]


   Last Infusion: 21 20:08 Dose:  0 mls/hr


   Documented by: 


Oxytocin/Sodium Chloride (Oxytocin 30 Unit/500 Ml-Ns)  30 unit in 500 mls @ 999 

mls/hr IV TITRATE [facility]


Tranexamic Acid 1,000 mg/ (Sodium Chloride)  110 mls @ 660 mls/hr IV ONETIME PRN


   PRN Reason: Bleeding


Oxytocin/Sodium Chloride (Oxytocin 30 Unit/500 Ml-Ns)  30 unit in 500 mls @ 2 

mls/hr IV TITRATE [facility]; Protocol


   Last Titration: 21 20:08 Dose:  999 munits/min, 999 mls/hr


   Documented by: 


Ibuprofen (Ibuprofen 400 Mg Tab)  400 mg PO Q4H PRN


   PRN Reason: Pain (mild 1-3)


Ibuprofen (Ibuprofen 800 Mg Tab)  800 mg PO Q6H PRN


   PRN Reason: Pain (mild 1-3)


   Last Admin: 21 08:38 Dose:  800 mg


   Documented by: 


Lidocaine HCl (Lidocaine 1% 50 Ml Mdv)  50 ml INJECT ONETIME PRN


   PRN Reason: Laceration repair


Methylergonovine Maleate (Methylergonovine 0.2 Mg/1 Ml Amp)  0.2 mg IM 

ASDIRECTED PRN


   PRN Reason: Post Partum Hemorrhage


Misoprostol (Misoprostol 200 Mcg Tab)  200 mcg PO ONETIME PRN


   PRN Reason: Post Partum Hemorrhage


Nalbuphine HCl (Nalbuphine 10 Mg/1 Ml Vial)  10 mg IVPUSH Q1H PRN


   PRN Reason: Pain (severe 7-10)


Ondansetron HCl (Ondansetron 4 Mg/2 Ml Sdv)  4 mg IVPUSH Q6H PRN


   PRN Reason: Nausea/Vomiting


Oxycodone HCl (Oxycodone 5 Mg Tab)  5 mg PO Q2H PRN


   PRN Reason: Pain (severe 7-10)


Sodium Chloride (Sodium Chloride 0.9% 10 Ml Syringe)  10 ml FLUSH ASDIRECTED PRN


   PRN Reason: Keep Vein Open


Sodium Chloride (Sodium Chloride 0.9% 2.5 Ml Syringe)  2.5 ml FLUSH ASDIRECTED 

PRN


   PRN Reason: Keep Vein Open


Sodium Chloride (Sodium Chloride 0.9% 10 Ml Sdv)  10 ml IV ASDIRECTED PRN


   PRN Reason: IV Use


Sterile Water (Water For Irrigation,Sterile 1,000 Ml Container)  1,000 ml IRR 

ASDIRECTED PRN


   PRN Reason: delivery


Terbutaline Sulfate (Terbutaline 1 Mg/Ml Sdv)  0.25 mg SUBCUT ASDIRECTED PRN


   PRN Reason: Tacysystole


Witch Hazel (Witch Hazel Medicated Pads 40/Jar)  1 pad TOP ASDIRECTED PRN


   PRN Reason: comfort care


   Last Admin: 21 21:20 Dose:  1 tub


   Documented by: 





Discontinued Medications





Bupivacaine HCl (Bupivacaine 0.25% 10 Ml Sdv) Confirm Administered Dose 10 ml 

.ROUTE .STK-MED ONE


   Stop: 21 16:00


Ampicillin Sodium 2 gm/ Sodium (Chloride)  100 mls @ 200 mls/hr IV ONETIME ONE


   Stop: 21 10:59


   Last Admin: 21 10:35 Dose:  200 mls/hr


   Documented by: 


Ampicillin Sodium 1 gm/ Sodium (Chloride)  50 mls @ 100 mls/hr IV Q4H [facility]


   Last Admin: 21 18:45 Dose:  100 mls/hr


   Documented by: 


Ropivacaine (Naropin 0.2%) Confirm Administered Dose 200 mls @ as directed 

.ROUTE .STK-MED ONE


   Stop: 21 16:00











- Infant Interaction


Infant Disposition, Postpartum:  in Room with Family


Infant Interaction: Holding Infant


Infant Feeding: Attempted Breastfeeding; Nursed Fair/Poor


Support Person: 





- Postpartum Recovery Exam


Fundal Tone: Firm


Fundal Level: 1 Fingerbreadths Below Umbilicus


Fundal Placement: Midline


Lochia Amount: Scant


Lochia Color: Rubra/Red


Perineum Description: Intact, Minimal Bruising/Swelling


Episiotomy/Laceration: None


Bladder Status: Voiding


Urinary Elimination: Voided





- Exam


General: Alert, Oriented


HEENT: Pupils Equal


Neck: Supple


Lungs: Clear to Auscultation, Normal Respiratory Effort


Cardiovascular: Regular Rate, Regular Rhythm


GI/Abdominal Exam: Normal Bowel Sounds, Soft, Non-Tender, No Organomegaly, No 

Distention, No Abnormal Bruit, No Mass, Pelvis Stable


Extremities: Normal Inspection, Normal Range of Motion, Non-Tender, No Pedal 

Edema, Normal Capillary Refill


Skin: Warm, Dry, Intact


Wound/Incisions: Healing Well


Neurological: No New Focal Deficit


Psy/Mental Status: Alert, Normal Affect, Normal Mood





- Problem List Review


Problem List Initiated/Reviewed/Updated: Yes





- My Orders


Last 24 Hours: 


My Active Orders





21 10:00


May Shower [RC] ASDIRECTED 


Butorphanol [Stadol]   1 mg IVPUSH Q1H PRN 


Carboprost Tromethamine [Hemabate DS]   250 mcg IM ASDIRECTED PRN 


Lactated Ringers [Ringers, Lactated] 1,000 ml IV ASDIRECTED 


Lidocaine 1% [Xylocaine 1%]   50 ml INJECT ONETIME PRN 


Methylergonovine [Methergine]   0.2 mg IM ASDIRECTED PRN 


Nalbuphine [Nubain]   10 mg IVPUSH Q1H PRN 


Ondansetron [Zofran]   4 mg IVPUSH Q6H PRN 


Oxytocin/0.9 % Sodium Chloride [Oxytocin 30 Unit/500 ML-NS] 30 unit in 500 ml IV

TITRATE 


Sodium Chloride 0.9% [Normal Saline]   10 ml IV ASDIRECTED PRN 


Sodium Chloride 0.9% [Saline Flush]   10 ml FLUSH ASDIRECTED PRN 


Sodium Chloride 0.9% [Saline Flush]   2.5 ml FLUSH ASDIRECTED PRN 


Tranexamic Acid [Cyklokapron] 1,000 mg   Sodium Chloride 0.9% [Normal Saline] 

100 ml IV ONETIME 


Water For Irrigation,Sterile [Sterile Water for Irrigation]   1,000 ml IRR 

ASDIRECTED PRN 


miSOPROStoL [Cytotec]   200 mcg PO ONETIME PRN 


Fetal Scalp Electrode [WOMSER] Per Unit Routine 


Peripheral IV Insertion Adult [OM.PC] Routine 





21 10:23


RPR (SYPHILIS SERO) W/ RFLX [REF] Routine 





21 Lunch


Regular Diet [DIET] 





21 18:50


Terbutaline [Brethine]   0.25 mg SUBCUT ASDIRECTED PRN 





21 19:00


Oxytocin/0.9 % Sodium Chloride [Oxytocin 30 Unit/500 ML-NS] 30 unit in 500 ml IV

TITRATE 





21 20:16


Patient Status [ADT] Routine 


Vital Signs [RC] PER UNIT ROUTINE 


Acetaminophen [Tylenol Extra Strength]   1,000 mg PO Q4H PRN 


Acetaminophen [Tylenol Extra Strength]   500 mg PO Q4H PRN 


Benzocaine/Menthol [Dermoplast Pain Relief 20%-0.5% Spray]   78 gm TOP 

ASDIRECTED PRN 


Docusate Sodium [Colace]   100 mg PO Q12H PRN 


Ibuprofen [Motrin]   400 mg PO Q4H PRN 


Ibuprofen [Motrin]   800 mg PO Q6H PRN 


Lanolin [Lansinoh HPA]   See Dose Instructions  TOP ASDIRECTED PRN 


bisacodyL [Dulcolax]   10 mg RECTAL ONETIME PRN 


oxyCODONE   5 mg PO Q2H PRN 


witch hazeL [Tucks]   1 pad TOP ASDIRECTED PRN 


Assess Lochia [WOMSER] Per Unit Routine 


Assess Uterine Involution [WOMSER] Per Unit Routine 


Peripheral IV Discontinue [OM.PC] Routine 














- Assessment


Assessment:: 





S/P  doing well.





- Plan


Plan:: 





IUP 39+6 . multipara in active labor. GBS + on antibiotic as per protocol

## 2021-07-04 NOTE — PCM.DCSUM1
**Discharge Summary





- Hospital Course


Diagnosis: Stroke: No





- Discharge Data


Discharge Date: 07/04/21


Discharge Disposition: Home, Self-Care 01


Condition: Good





- Referral to Home Health


Primary Care Physician: 


PCP None








- Patient Instructions


Diet: Usual Diet as Tolerated


Activity: As Tolerated


Driving: Do Not Drive


Showering/Bathing: May Shower





- Discharge Plan


Home Medications: 


                                    Home Meds





Calcium Carbonate [Calcium] 500 mg PO DAILY 07/03/21 [History]


Omega-3 Fatty Acids/Fish Oil [Fish Oil 1,000 mg Capsule] 1 each PO DAILY 

07/03/21 [History]


Prenatal Vits #93/Iron Fum/FA [Prenatal Formula Tablet] 1 each PO DAILY 07/03/21

[History]











- Discharge Summary/Plan Comment


DC Time >30 min.: Yes





- General Info


Date of Service: 07/04/21


Functional Status: Reports: Pain Controlled





- Review of Systems


General: Reports: No Symptoms


HEENT: Reports: No Symptoms


Pulmonary: Reports: No Symptoms


Cardiovascular: Reports: No Symptoms


Gastrointestinal: Reports: No Symptoms


Genitourinary: Reports: No Symptoms


Musculoskeletal: Reports: No Symptoms


Skin: Reports: No Symptoms


Neurological: Reports: No Symptoms


Psychiatric: Reports: No Symptoms





- Patient Data


Vitals - Most Recent: 


                                Last Vital Signs











Temp  35.9 C L  07/04/21 08:15


 


Pulse  63   07/04/21 08:15


 


Resp  16   07/04/21 08:15


 


BP  128/80   07/04/21 08:15


 


Pulse Ox  98   07/04/21 08:15











Weight - Most Recent: 63.957 kg


I&O - Last 24 hours: 


                                 Intake & Output











 07/03/21 07/04/21 07/04/21





 22:59 06:59 14:59


 


Output Total 350  


 


Balance -350  











Lab Results - Last 24 hrs: 


                         Laboratory Results - last 24 hr











  07/03/21 07/03/21 07/04/21 Range/Units





  10:05 10:23 05:28 


 


Hgb    10.6 L  (12.0-16.0)  g/dL


 


Hct    31.0 L  (36.0-46.0)  %


 


SARS-CoV-2 RNA (NEYMAR)  NEGATIVE    (NEGATIVE)  


 


Blood Type   O POSITIVE   


 


Antibody Screen   NEGATIVE   











Med Orders - Current: 


                               Current Medications





Acetaminophen (Acetaminophen 500 Mg Tab)  500 mg PO Q4H PRN


   PRN Reason: Pain (mild 1-3)


Acetaminophen (Acetaminophen 500 Mg Tab)  1,000 mg PO Q4H PRN


   PRN Reason: Pain (mild 1-3)


   Last Admin: 07/04/21 02:28 Dose:  1,000 mg


   Documented by: 


Benzocaine/Menthol (Benzocaine/Menthol 20%-0.5% Spray 78 Gm Cannister)  78 gm 

TOP ASDIRECTED PRN


   PRN Reason: Perineal Comfort Measure


   Last Admin: 07/03/21 21:20 Dose:  1 canister


   Documented by: 


Bisacodyl (Bisacodyl 10 Mg Supp)  10 mg RECTAL ONETIME PRN


   PRN Reason: Constipation


Butorphanol Tartrate (Butorphanol 1 Mg/Ml Sdv)  1 mg IVPUSH Q1H PRN


   PRN Reason: Pain (severe 7-10)


Carboprost Tromethamine (Carboprost Tromethamine 250 Mcg/1 Ml Amp)  250 mcg IM 

ASDIRECTED PRN


   PRN Reason: Post Partum Hemorrhage


Docusate Sodium (Docusate Sodium 100 Mg Cap)  100 mg PO Q12H PRN


   PRN Reason: Constipation


   Last Admin: 07/04/21 08:38 Dose:  100 mg


   Documented by: 


Emollient Ointment (Lanolin 100% Cream 7 Gm Tube)  0 gm TOP ASDIRECTED PRN


   PRN Reason: Sore Nipples


   Last Admin: 07/03/21 21:20 Dose:  7 gm


   Documented by: 


Lactated Ringer's (Ringers, Lactated)  1,000 mls @ 150 mls/hr IV ASDIRECTED [facility]


   Last Infusion: 07/03/21 20:08 Dose:  0 mls/hr


   Documented by: 


Oxytocin/Sodium Chloride (Oxytocin 30 Unit/500 Ml-Ns)  30 unit in 500 mls @ 999 

mls/hr IV TITRATE [facility]


Tranexamic Acid 1,000 mg/ (Sodium Chloride)  110 mls @ 660 mls/hr IV ONETIME PRN


   PRN Reason: Bleeding


Oxytocin/Sodium Chloride (Oxytocin 30 Unit/500 Ml-Ns)  30 unit in 500 mls @ 2 

mls/hr IV TITRATE [facility]; Protocol


   Last Titration: 07/03/21 20:08 Dose:  999 munits/min, 999 mls/hr


   Documented by: 


Ibuprofen (Ibuprofen 400 Mg Tab)  400 mg PO Q4H PRN


   PRN Reason: Pain (mild 1-3)


Ibuprofen (Ibuprofen 800 Mg Tab)  800 mg PO Q6H PRN


   PRN Reason: Pain (mild 1-3)


   Last Admin: 07/04/21 08:38 Dose:  800 mg


   Documented by: 


Lidocaine HCl (Lidocaine 1% 50 Ml Mdv)  50 ml INJECT ONETIME PRN


   PRN Reason: Laceration repair


Methylergonovine Maleate (Methylergonovine 0.2 Mg/1 Ml Amp)  0.2 mg IM 

ASDIRECTED PRN


   PRN Reason: Post Partum Hemorrhage


Misoprostol (Misoprostol 200 Mcg Tab)  200 mcg PO ONETIME PRN


   PRN Reason: Post Partum Hemorrhage


Nalbuphine HCl (Nalbuphine 10 Mg/1 Ml Vial)  10 mg IVPUSH Q1H PRN


   PRN Reason: Pain (severe 7-10)


Ondansetron HCl (Ondansetron 4 Mg/2 Ml Sdv)  4 mg IVPUSH Q6H PRN


   PRN Reason: Nausea/Vomiting


Oxycodone HCl (Oxycodone 5 Mg Tab)  5 mg PO Q2H PRN


   PRN Reason: Pain (severe 7-10)


Sodium Chloride (Sodium Chloride 0.9% 10 Ml Syringe)  10 ml FLUSH ASDIRECTED PRN


   PRN Reason: Keep Vein Open


Sodium Chloride (Sodium Chloride 0.9% 2.5 Ml Syringe)  2.5 ml FLUSH ASDIRECTED 

PRN


   PRN Reason: Keep Vein Open


Sodium Chloride (Sodium Chloride 0.9% 10 Ml Sdv)  10 ml IV ASDIRECTED PRN


   PRN Reason: IV Use


Sterile Water (Water For Irrigation,Sterile 1,000 Ml Container)  1,000 ml IRR 

ASDIRECTED PRN


   PRN Reason: delivery


Terbutaline Sulfate (Terbutaline 1 Mg/Ml Sdv)  0.25 mg SUBCUT ASDIRECTED PRN


   PRN Reason: Tacysystole


Witch Hazel (Witch Hazel Medicated Pads 40/Jar)  1 pad TOP ASDIRECTED PRN


   PRN Reason: comfort care


   Last Admin: 07/03/21 21:20 Dose:  1 tub


   Documented by: 





Discontinued Medications





Bupivacaine HCl (Bupivacaine 0.25% 10 Ml Sdv) Confirm Administered Dose 10 ml 

.ROUTE .STK-MED ONE


   Stop: 07/03/21 16:00


Ampicillin Sodium 2 gm/ Sodium (Chloride)  100 mls @ 200 mls/hr IV ONETIME ONE


   Stop: 07/03/21 10:59


   Last Admin: 07/03/21 10:35 Dose:  200 mls/hr


   Documented by: 


Ampicillin Sodium 1 gm/ Sodium (Chloride)  50 mls @ 100 mls/hr IV Q4H DANNI


   Last Admin: 07/03/21 18:45 Dose:  100 mls/hr


   Documented by: 


Ropivacaine (Naropin 0.2%) Confirm Administered Dose 200 mls @ as directed 

.ROUTE .STK-MED ONE


   Stop: 07/03/21 16:00











- Exam


General: Reports: Alert, Oriented


HEENT: Reports: Pupils Equal, Pupils Reactive, EOMI, Mucous Membr. Moist/Pink


Neck: Reports: Supple


Lungs: Reports: Clear to Auscultation, Normal Respiratory Effort


Cardiovascular: Reports: Regular Rate, Regular Rhythm


GI/Abdominal Exam: Normal Bowel Sounds, Soft, Non-Tender, No Organomegaly, No 

Distention, No Abnormal Bruit, No Mass, Pelvis Stable


 (Female) Exam: Normal External Exam, Normal Speculum Exam, Normal Bimanual 

Exam


Rectal (Female) Exam: Normal Exam, Normal Rectal Tone


Back Exam: Reports: Normal Inspection, Full Range of Motion


Extremities: Normal Inspection, Normal Range of Motion, Non-Tender, No Pedal 

Edema, Normal Capillary Refill


Skin: Reports: Warm, Dry, Intact


Wound/Incisions: Reports: Healing Well


Neurological: Reports: No New Focal Deficit


Psy/Mental Status: Reports: Alert, Normal Affect, Normal Mood

## 2021-07-04 NOTE — OR
SURGEON:

Gamal Cheatham MD

 

DATE OF PROCEDURE:  2021

 

Ms. Nielson is 31.  She is multiparous.  She is 39 plus 6.  She is followed in

our clinic jointly by myself and our nurse midwifery service.  She was admitted

this morning in early labor.  At the time of admission, she was 3 to 4, 90%,

intact vertex presentation, with mild-to-moderate contraction.  GBS status was

positive.  The patient was started on antibiotic as per protocol.  She progress

rather slowly to 5 to 6 cm.  At 5 o'clock in the afternoon, when she received

adequate dose of for antibiotic, I did an artificial rupture of the membrane

with clear fluid.  At the time of the rupture, she was 5 to 6 cm, vertex, and -1

station.  She had epidural anesthesia for labor analgesia.  However, her

contractions spaced out, so we started her on low-dose Pitocin, and she

responded to it very well.  She progressed rather swiftly to a complete-

complete, vertex, zero station, and she was able to accomplish normal

spontaneous vaginal delivery of a male fetus, who cried immediately.  Apgar

score reported to be 8 and 9.  The weight is not available.  The placenta

delivered spontaneous, complete, and intact.  There was no episiotomy needed.

There was no perineal, labial, or vaginal laceration.  The placenta delivered

spontaneous, complete, and intact.  Estimated blood loss is 300-350 mL. Fetal

heart rate was category 1 through the entire process of labor.  There was 1

nuchal cord that was noted at the time of the delivery and reduced without any

problem.  There was no complication in the labor or the delivery process of this

patient.

 

 

DARWIN / GEORGINA

DD:  2021 20:20:40

DT:  2021 20:54:15

Job #:  [id number]

## 2021-07-05 NOTE — [id number]
Post Anesthesia Note





- EVALUATION WITHIN 48HRS OF ANESTHETIC


Vital Signs in Normal Range: Yes


Patient Participated in Evaluation: Yes


Respiratory Function Stable: Yes


Airway Patent: Yes


Cardiovascular Function Stable: Yes


Hydration Status Stable: Yes


Pain Control Satisfactory: Yes


Nausea and Vomiting Control Satisfactory: Yes


Mental Status Recovered: Yes


Vital Signs: 


                                Last Vital Signs











Temp  97.5 F   07/04/21 20:00


 


Pulse  77   07/04/21 20:00


 


Resp  18   07/04/21 20:00


 


BP  115/67   07/04/21 20:00


 


Pulse Ox  98   07/04/21 20:00

## 2021-07-05 NOTE — PCM.POSTAN
POST ANESTHESIA ASSESSMENT





- MENTAL STATUS


Mental Status: Alert, Oriented





- VITAL SIGNS


Vital Signs: 


                                Last Vital Signs











Temp  97.5 F   07/04/21 20:00


 


Pulse  77   07/04/21 20:00


 


Resp  18   07/04/21 20:00


 


BP  115/67   07/04/21 20:00


 


Pulse Ox  98   07/04/21 20:00














- RESPIRATORY


Respiratory Status: Respiratory Rate WNL, Airway Patent, O2 Saturation Stable





- CARDIOVASCULAR


CV Status: Pulse Rate WNL, Blood Pressure Stable





- GASTROINTESTINAL


GI Status: No Symptoms





- POST OP HYDRATION


Hydration Status: Adequate & Stable